# Patient Record
Sex: MALE | Race: WHITE | NOT HISPANIC OR LATINO | URBAN - METROPOLITAN AREA
[De-identification: names, ages, dates, MRNs, and addresses within clinical notes are randomized per-mention and may not be internally consistent; named-entity substitution may affect disease eponyms.]

---

## 2020-02-24 ENCOUNTER — APPOINTMENT (RX ONLY)
Dept: URBAN - METROPOLITAN AREA CLINIC 37 | Facility: CLINIC | Age: 75
Setting detail: DERMATOLOGY
End: 2020-02-24

## 2020-02-24 DIAGNOSIS — B02.9 ZOSTER WITHOUT COMPLICATIONS: ICD-10-CM

## 2020-02-24 PROCEDURE — 99202 OFFICE O/P NEW SF 15 MIN: CPT

## 2020-02-24 NOTE — HPI: SHINGLES (HERPES ZOSTER)
How Severe Are Your Shingles?: moderate
Is This A New Presentation, Or A Follow-Up?: Shingles
Additional History: Pt states he is on 400mg of Acyclovir.

## 2021-05-05 PROBLEM — H35.9 UNSPECIFIED RETINAL DISORDER: Noted: 2021-05-05

## 2021-05-05 PROBLEM — Z98.41 CATARACT EXTRACTION STATUS: Noted: 2020-12-01

## 2021-05-05 PROBLEM — H43.813 POSTERIOR VITREOUS DETACHMENT: Noted: 2022-01-19

## 2021-05-05 PROBLEM — Z98.42 CATARACT EXTRACTION STATUS: Noted: 2018-12-26

## 2021-05-05 PROBLEM — H35.3122 DRY AMD, INTERMEDIATE DRY STAGE: Noted: 2022-01-19

## 2021-05-05 PROBLEM — H35.3211 NEOVASCULAR AMD WITH ACTIVE CNV: Noted: 2022-01-19

## 2021-05-05 PROBLEM — H25.13 NS CATARACT: Noted: 2022-01-19

## 2021-05-05 PROBLEM — H40.1134 POAG, INDETERMINATE: Noted: 2022-01-19

## 2022-01-19 ENCOUNTER — PREPPED CHART (OUTPATIENT)
Dept: URBAN - METROPOLITAN AREA CLINIC 101 | Facility: CLINIC | Age: 77
End: 2022-01-19

## 2022-01-19 PROBLEM — H35.3122 DRY AMD, INTERMEDIATE DRY STAGE: Noted: 2022-01-19

## 2022-01-19 PROBLEM — H40.1134 POAG, INDETERMINATE: Noted: 2022-01-19

## 2022-01-19 PROBLEM — E11.3293 MILD NONPROLIFERATIVE DIABETIC RETINOPATHY: Noted: 2022-01-19

## 2022-01-19 PROBLEM — H35.3211 NEOVASCULAR AMD WITH ACTIVE CNV: Noted: 2022-01-19

## 2022-01-19 PROBLEM — H43.813 POSTERIOR VITREOUS DETACHMENT: Noted: 2022-01-19

## 2022-02-15 ASSESSMENT — VISUAL ACUITY
OS_CC: 20/25-1
OD_CC: 20/60-2
OD_PH: 20/50-2

## 2022-02-15 ASSESSMENT — TONOMETRY
OS_IOP_MMHG: 13
OD_IOP_MMHG: 12

## 2022-02-23 ENCOUNTER — FOLLOW UP (OUTPATIENT)
Dept: URBAN - METROPOLITAN AREA CLINIC 101 | Facility: CLINIC | Age: 77
End: 2022-02-23

## 2022-02-23 DIAGNOSIS — H35.3211: ICD-10-CM

## 2022-02-23 DIAGNOSIS — H43.813: ICD-10-CM

## 2022-02-23 DIAGNOSIS — H40.1134: ICD-10-CM

## 2022-02-23 DIAGNOSIS — H35.3122: ICD-10-CM

## 2022-02-23 DIAGNOSIS — E11.3293: ICD-10-CM

## 2022-02-23 PROCEDURE — 92014 COMPRE OPH EXAM EST PT 1/>: CPT | Mod: 25

## 2022-02-23 PROCEDURE — PFS EYLEA PFS

## 2022-02-23 PROCEDURE — 92134 CPTRZ OPH DX IMG PST SGM RTA: CPT

## 2022-02-23 PROCEDURE — 67028 INJECTION EYE DRUG: CPT

## 2022-02-23 ASSESSMENT — VISUAL ACUITY
OD_CC: 20/50-2
OS_CC: 20/30-2
OS_PH: 20/25-1

## 2022-02-23 ASSESSMENT — TONOMETRY
OD_IOP_MMHG: 16
OS_IOP_MMHG: 17

## 2022-03-30 ENCOUNTER — FOLLOW UP (OUTPATIENT)
Dept: URBAN - METROPOLITAN AREA CLINIC 101 | Facility: CLINIC | Age: 77
End: 2022-03-30

## 2022-03-30 DIAGNOSIS — H35.3122: ICD-10-CM

## 2022-03-30 DIAGNOSIS — H40.1134: ICD-10-CM

## 2022-03-30 DIAGNOSIS — E11.3293: ICD-10-CM

## 2022-03-30 DIAGNOSIS — H43.813: ICD-10-CM

## 2022-03-30 DIAGNOSIS — H35.3211: ICD-10-CM

## 2022-03-30 PROCEDURE — 67028 INJECTION EYE DRUG: CPT

## 2022-03-30 PROCEDURE — 92014 COMPRE OPH EXAM EST PT 1/>: CPT | Mod: 25

## 2022-03-30 PROCEDURE — PFS EYLEA PFS

## 2022-03-30 PROCEDURE — 92134 CPTRZ OPH DX IMG PST SGM RTA: CPT

## 2022-03-30 ASSESSMENT — VISUAL ACUITY
OS_CC: 20/25
OD_CC: 20/50+1

## 2022-03-30 ASSESSMENT — TONOMETRY
OS_IOP_MMHG: 18
OD_IOP_MMHG: 18

## 2022-05-12 ENCOUNTER — FOLLOW UP (OUTPATIENT)
Dept: URBAN - METROPOLITAN AREA CLINIC 101 | Facility: CLINIC | Age: 77
End: 2022-05-12

## 2022-05-12 DIAGNOSIS — H35.3211: ICD-10-CM

## 2022-05-12 DIAGNOSIS — E11.3293: ICD-10-CM

## 2022-05-12 DIAGNOSIS — H43.813: ICD-10-CM

## 2022-05-12 DIAGNOSIS — H35.3122: ICD-10-CM

## 2022-05-12 PROCEDURE — 67028 INJECTION EYE DRUG: CPT

## 2022-05-12 PROCEDURE — 92202 OPSCPY EXTND ON/MAC DRAW: CPT | Mod: 59

## 2022-05-12 PROCEDURE — PFS EYLEA PFS

## 2022-05-12 PROCEDURE — 92134 CPTRZ OPH DX IMG PST SGM RTA: CPT

## 2022-05-12 PROCEDURE — 92014 COMPRE OPH EXAM EST PT 1/>: CPT | Mod: 25

## 2022-05-12 ASSESSMENT — TONOMETRY
OS_IOP_MMHG: 15
OD_IOP_MMHG: 18

## 2022-05-12 ASSESSMENT — VISUAL ACUITY
OS_PH: 20/25-2
OD_PH: 20/50-2
OD_CC: 20/60-1
OS_CC: 20/30-1

## 2022-06-23 ENCOUNTER — FOLLOW UP (OUTPATIENT)
Dept: URBAN - METROPOLITAN AREA CLINIC 101 | Facility: CLINIC | Age: 77
End: 2022-06-23

## 2022-06-23 DIAGNOSIS — H35.3122: ICD-10-CM

## 2022-06-23 DIAGNOSIS — H35.3211: ICD-10-CM

## 2022-06-23 DIAGNOSIS — E11.3293: ICD-10-CM

## 2022-06-23 PROCEDURE — 92014 COMPRE OPH EXAM EST PT 1/>: CPT | Mod: 25

## 2022-06-23 PROCEDURE — PFS EYLEA PFS

## 2022-06-23 PROCEDURE — 67028 INJECTION EYE DRUG: CPT

## 2022-06-23 PROCEDURE — 92202 OPSCPY EXTND ON/MAC DRAW: CPT | Mod: 59

## 2022-06-23 PROCEDURE — 92134 CPTRZ OPH DX IMG PST SGM RTA: CPT

## 2022-06-23 ASSESSMENT — VISUAL ACUITY
OD_CC: 20/50-2
OS_CC: 20/30

## 2022-06-23 ASSESSMENT — TONOMETRY
OD_IOP_MMHG: 20
OS_IOP_MMHG: 21

## 2022-08-04 ENCOUNTER — FOLLOW UP (OUTPATIENT)
Dept: URBAN - METROPOLITAN AREA CLINIC 101 | Facility: CLINIC | Age: 77
End: 2022-08-04

## 2022-08-04 DIAGNOSIS — H35.3211: ICD-10-CM

## 2022-08-04 DIAGNOSIS — H35.3122: ICD-10-CM

## 2022-08-04 DIAGNOSIS — E11.3293: ICD-10-CM

## 2022-08-04 PROCEDURE — 92202 OPSCPY EXTND ON/MAC DRAW: CPT | Mod: 59

## 2022-08-04 PROCEDURE — 67028 INJECTION EYE DRUG: CPT

## 2022-08-04 PROCEDURE — PFS EYLEA PFS

## 2022-08-04 PROCEDURE — 92134 CPTRZ OPH DX IMG PST SGM RTA: CPT

## 2022-08-04 PROCEDURE — 92014 COMPRE OPH EXAM EST PT 1/>: CPT | Mod: 25

## 2022-08-04 ASSESSMENT — VISUAL ACUITY
OS_PH: 20/25-1
OD_SC: 20/60
OS_SC: 20/30-1

## 2022-08-04 ASSESSMENT — TONOMETRY
OD_IOP_MMHG: 20
OS_IOP_MMHG: 25

## 2022-09-15 ENCOUNTER — FOLLOW UP (OUTPATIENT)
Dept: URBAN - METROPOLITAN AREA CLINIC 101 | Facility: CLINIC | Age: 77
End: 2022-09-15

## 2022-09-15 DIAGNOSIS — H35.3122: ICD-10-CM

## 2022-09-15 DIAGNOSIS — H35.3211: ICD-10-CM

## 2022-09-15 DIAGNOSIS — E11.3293: ICD-10-CM

## 2022-09-15 PROCEDURE — 67028 INJECTION EYE DRUG: CPT

## 2022-09-15 PROCEDURE — 92134 CPTRZ OPH DX IMG PST SGM RTA: CPT

## 2022-09-15 PROCEDURE — PFS EYLEA PFS

## 2022-09-15 PROCEDURE — 92014 COMPRE OPH EXAM EST PT 1/>: CPT | Mod: 25

## 2022-09-15 PROCEDURE — 92202 OPSCPY EXTND ON/MAC DRAW: CPT | Mod: 59

## 2022-09-15 ASSESSMENT — VISUAL ACUITY
OS_PH: 20/25-1
OS_SC: 20/30-1
OD_PH: 20/50-2
OD_SC: 20/60-2

## 2022-09-15 ASSESSMENT — TONOMETRY
OS_IOP_MMHG: 17
OD_IOP_MMHG: 15

## 2022-10-27 ENCOUNTER — FOLLOW UP (OUTPATIENT)
Dept: URBAN - METROPOLITAN AREA CLINIC 101 | Facility: CLINIC | Age: 77
End: 2022-10-27

## 2022-10-27 DIAGNOSIS — H35.3211: ICD-10-CM

## 2022-10-27 DIAGNOSIS — H35.3122: ICD-10-CM

## 2022-10-27 DIAGNOSIS — E11.3293: ICD-10-CM

## 2022-10-27 PROCEDURE — 92014 COMPRE OPH EXAM EST PT 1/>: CPT | Mod: 25

## 2022-10-27 PROCEDURE — 92202 OPSCPY EXTND ON/MAC DRAW: CPT | Mod: 59

## 2022-10-27 PROCEDURE — 67028 INJECTION EYE DRUG: CPT

## 2022-10-27 PROCEDURE — PFS EYLEA PFS

## 2022-10-27 PROCEDURE — 92134 CPTRZ OPH DX IMG PST SGM RTA: CPT

## 2022-10-27 ASSESSMENT — TONOMETRY
OS_IOP_MMHG: 24
OD_IOP_MMHG: 16

## 2022-10-27 ASSESSMENT — VISUAL ACUITY
OD_PH: 20/50
OS_PH: 20/25
OS_SC: 20/30
OD_SC: 20/60-2

## 2022-12-08 ENCOUNTER — FOLLOW UP (OUTPATIENT)
Dept: URBAN - METROPOLITAN AREA CLINIC 101 | Facility: CLINIC | Age: 77
End: 2022-12-08

## 2022-12-08 DIAGNOSIS — H43.813: ICD-10-CM

## 2022-12-08 DIAGNOSIS — H35.3211: ICD-10-CM

## 2022-12-08 DIAGNOSIS — E11.3293: ICD-10-CM

## 2022-12-08 DIAGNOSIS — H35.3122: ICD-10-CM

## 2022-12-08 PROCEDURE — 67028 INJECTION EYE DRUG: CPT

## 2022-12-08 PROCEDURE — 92134 CPTRZ OPH DX IMG PST SGM RTA: CPT

## 2022-12-08 PROCEDURE — 92014 COMPRE OPH EXAM EST PT 1/>: CPT | Mod: 25

## 2022-12-08 PROCEDURE — PFS EYLEA PFS

## 2022-12-08 PROCEDURE — 92202 OPSCPY EXTND ON/MAC DRAW: CPT | Mod: 59

## 2022-12-08 ASSESSMENT — TONOMETRY
OD_IOP_MMHG: 17
OS_IOP_MMHG: 20

## 2022-12-08 ASSESSMENT — VISUAL ACUITY
OD_CC: 20/50
OS_CC: 20/25

## 2023-02-02 ENCOUNTER — FOLLOW UP (OUTPATIENT)
Dept: URBAN - METROPOLITAN AREA CLINIC 101 | Facility: CLINIC | Age: 78
End: 2023-02-02

## 2023-02-02 DIAGNOSIS — H35.3211: ICD-10-CM

## 2023-02-02 DIAGNOSIS — H43.813: ICD-10-CM

## 2023-02-02 DIAGNOSIS — E11.3293: ICD-10-CM

## 2023-02-02 DIAGNOSIS — H35.3122: ICD-10-CM

## 2023-02-02 PROCEDURE — 67028 INJECTION EYE DRUG: CPT

## 2023-02-02 PROCEDURE — 92014 COMPRE OPH EXAM EST PT 1/>: CPT | Mod: 25

## 2023-02-02 PROCEDURE — 92202 OPSCPY EXTND ON/MAC DRAW: CPT | Mod: 59

## 2023-02-02 PROCEDURE — 92134 CPTRZ OPH DX IMG PST SGM RTA: CPT

## 2023-02-02 PROCEDURE — PFS EYLEA PFS

## 2023-02-02 ASSESSMENT — VISUAL ACUITY
OD_CC: 20/60-1
OS_CC: 20/25-1

## 2023-02-02 ASSESSMENT — TONOMETRY
OD_IOP_MMHG: 13
OS_IOP_MMHG: 17

## 2023-03-23 ENCOUNTER — FOLLOW UP (OUTPATIENT)
Dept: URBAN - METROPOLITAN AREA CLINIC 101 | Facility: CLINIC | Age: 78
End: 2023-03-23

## 2023-03-23 DIAGNOSIS — E11.3293: ICD-10-CM

## 2023-03-23 DIAGNOSIS — H35.3211: ICD-10-CM

## 2023-03-23 DIAGNOSIS — H43.813: ICD-10-CM

## 2023-03-23 DIAGNOSIS — H40.1134: ICD-10-CM

## 2023-03-23 DIAGNOSIS — H35.3122: ICD-10-CM

## 2023-03-23 PROCEDURE — 92134 CPTRZ OPH DX IMG PST SGM RTA: CPT

## 2023-03-23 PROCEDURE — 92014 COMPRE OPH EXAM EST PT 1/>: CPT | Mod: 25

## 2023-03-23 PROCEDURE — 92202 OPSCPY EXTND ON/MAC DRAW: CPT | Mod: 59

## 2023-03-23 PROCEDURE — PFS EYLEA PFS

## 2023-03-23 PROCEDURE — 67028 INJECTION EYE DRUG: CPT

## 2023-03-23 ASSESSMENT — TONOMETRY
OS_IOP_MMHG: 21
OD_IOP_MMHG: 19

## 2023-03-23 ASSESSMENT — VISUAL ACUITY
OS_CC: 20/30
OD_PH: 20/50-1
OD_CC: 20/70+2

## 2023-05-04 ENCOUNTER — APPOINTMENT (RX ONLY)
Dept: URBAN - METROPOLITAN AREA CLINIC 37 | Facility: CLINIC | Age: 78
Setting detail: DERMATOLOGY
End: 2023-05-04

## 2023-05-04 DIAGNOSIS — L57.0 ACTINIC KERATOSIS: ICD-10-CM

## 2023-05-04 DIAGNOSIS — L82.1 OTHER SEBORRHEIC KERATOSIS: ICD-10-CM

## 2023-05-04 DIAGNOSIS — D22 MELANOCYTIC NEVI: ICD-10-CM

## 2023-05-04 PROBLEM — D22.9 MELANOCYTIC NEVI, UNSPECIFIED: Status: ACTIVE | Noted: 2023-05-04

## 2023-05-04 PROCEDURE — 17003 DESTRUCT PREMALG LES 2-14: CPT

## 2023-05-04 PROCEDURE — ? TREATMENT REGIMEN

## 2023-05-04 PROCEDURE — ? COUNSELING

## 2023-05-04 PROCEDURE — ? ADDITIONAL NOTES

## 2023-05-04 PROCEDURE — 17000 DESTRUCT PREMALG LESION: CPT

## 2023-05-04 PROCEDURE — ? LIQUID NITROGEN

## 2023-05-04 PROCEDURE — 99203 OFFICE O/P NEW LOW 30 MIN: CPT | Mod: 25

## 2023-05-04 ASSESSMENT — LOCATION DETAILED DESCRIPTION DERM
LOCATION DETAILED: RIGHT FOREHEAD
LOCATION DETAILED: LEFT VENTRAL PROXIMAL FOREARM
LOCATION DETAILED: LEFT LATERAL UPPER BACK
LOCATION DETAILED: LEFT INFERIOR LATERAL FOREHEAD
LOCATION DETAILED: INFERIOR MID FOREHEAD

## 2023-05-04 ASSESSMENT — LOCATION SIMPLE DESCRIPTION DERM
LOCATION SIMPLE: INFERIOR FOREHEAD
LOCATION SIMPLE: LEFT FOREARM
LOCATION SIMPLE: LEFT FOREHEAD
LOCATION SIMPLE: RIGHT FOREHEAD
LOCATION SIMPLE: LEFT UPPER BACK

## 2023-05-04 ASSESSMENT — LOCATION ZONE DERM
LOCATION ZONE: ARM
LOCATION ZONE: FACE
LOCATION ZONE: TRUNK

## 2023-05-04 NOTE — HPI: BUMPS
How Severe Are Your Bumps?: moderate
Have Your Bumps Been Treated?: not been treated
Is This A New Presentation, Or A Follow-Up?: Bumps
Additional History: Patient thinks bumps on back are cysts

## 2023-05-04 NOTE — PROCEDURE: LIQUID NITROGEN
Render Note In Bullet Format When Appropriate: No
Show Aperture Variable?: Yes
Duration Of Freeze Thaw-Cycle (Seconds): 4
Consent: The patient's consent was obtained including but not limited to risks of crusting, scabbing, blistering, scarring, darker or lighter pigmentary change, recurrence, incomplete removal and infection.
Post-Care Instructions: I reviewed with the patient in detail post-care instructions. Patient is to wear sunprotection, and avoid picking at any of the treated lesions. Pt may apply Vaseline to crusted or scabbing areas.
Detail Level: Detailed

## 2023-05-11 ENCOUNTER — FOLLOW UP (OUTPATIENT)
Dept: URBAN - METROPOLITAN AREA CLINIC 101 | Facility: CLINIC | Age: 78
End: 2023-05-11

## 2023-05-11 DIAGNOSIS — H35.3122: ICD-10-CM

## 2023-05-11 DIAGNOSIS — H40.1134: ICD-10-CM

## 2023-05-11 DIAGNOSIS — H43.813: ICD-10-CM

## 2023-05-11 DIAGNOSIS — E11.3293: ICD-10-CM

## 2023-05-11 DIAGNOSIS — H35.3211: ICD-10-CM

## 2023-05-11 PROCEDURE — 92134 CPTRZ OPH DX IMG PST SGM RTA: CPT

## 2023-05-11 PROCEDURE — 92202 OPSCPY EXTND ON/MAC DRAW: CPT | Mod: 59

## 2023-05-11 PROCEDURE — 92014 COMPRE OPH EXAM EST PT 1/>: CPT | Mod: 25

## 2023-05-11 PROCEDURE — PFS EYLEA PFS

## 2023-05-11 PROCEDURE — 67028 INJECTION EYE DRUG: CPT

## 2023-05-11 ASSESSMENT — TONOMETRY
OS_IOP_MMHG: 20
OD_IOP_MMHG: 18

## 2023-05-11 ASSESSMENT — VISUAL ACUITY
OS_CC: 20/20-1
OD_PH: 20/60-2
OD_CC: 20/100-1

## 2023-07-06 ENCOUNTER — FOLLOW UP (OUTPATIENT)
Dept: URBAN - METROPOLITAN AREA CLINIC 101 | Facility: CLINIC | Age: 78
End: 2023-07-06

## 2023-07-06 DIAGNOSIS — H43.813: ICD-10-CM

## 2023-07-06 DIAGNOSIS — E11.3293: ICD-10-CM

## 2023-07-06 DIAGNOSIS — H35.3122: ICD-10-CM

## 2023-07-06 DIAGNOSIS — H40.1134: ICD-10-CM

## 2023-07-06 DIAGNOSIS — H35.3211: ICD-10-CM

## 2023-07-06 PROCEDURE — 92014 COMPRE OPH EXAM EST PT 1/>: CPT | Mod: 25

## 2023-07-06 PROCEDURE — 92134 CPTRZ OPH DX IMG PST SGM RTA: CPT

## 2023-07-06 PROCEDURE — PFS EYLEA PFS: Mod: JZ

## 2023-07-06 PROCEDURE — 92202 OPSCPY EXTND ON/MAC DRAW: CPT | Mod: 59

## 2023-07-06 PROCEDURE — 67028 INJECTION EYE DRUG: CPT

## 2023-07-06 ASSESSMENT — TONOMETRY
OD_IOP_MMHG: 21
OS_IOP_MMHG: 19
OD_IOP_MMHG: 22

## 2023-07-06 ASSESSMENT — VISUAL ACUITY
OS_CC: 20/50-2
OD_CC: 20/80-1

## 2023-09-21 ENCOUNTER — FOLLOW UP (OUTPATIENT)
Dept: URBAN - METROPOLITAN AREA CLINIC 101 | Facility: CLINIC | Age: 78
End: 2023-09-21

## 2023-09-21 DIAGNOSIS — H40.1134: ICD-10-CM

## 2023-09-21 DIAGNOSIS — H43.813: ICD-10-CM

## 2023-09-21 DIAGNOSIS — E11.3293: ICD-10-CM

## 2023-09-21 DIAGNOSIS — H35.3211: ICD-10-CM

## 2023-09-21 DIAGNOSIS — H35.3122: ICD-10-CM

## 2023-09-21 PROCEDURE — 67028 INJECTION EYE DRUG: CPT

## 2023-09-21 PROCEDURE — 92202 OPSCPY EXTND ON/MAC DRAW: CPT | Mod: 59

## 2023-09-21 PROCEDURE — 92014 COMPRE OPH EXAM EST PT 1/>: CPT | Mod: 25

## 2023-09-21 PROCEDURE — 92134 CPTRZ OPH DX IMG PST SGM RTA: CPT

## 2023-09-21 PROCEDURE — PFS EYLEA PFS: Mod: JZ

## 2023-09-21 ASSESSMENT — TONOMETRY
OS_IOP_MMHG: 20
OS_IOP_MMHG: 25
OD_IOP_MMHG: 25

## 2023-09-21 ASSESSMENT — VISUAL ACUITY
OS_CC: 20/20-2
OD_CC: 20/100-1
OD_PH: 20/70-1

## 2023-11-16 ENCOUNTER — FOLLOW UP (OUTPATIENT)
Dept: URBAN - METROPOLITAN AREA CLINIC 101 | Facility: CLINIC | Age: 78
End: 2023-11-16

## 2023-11-16 DIAGNOSIS — H35.3122: ICD-10-CM

## 2023-11-16 DIAGNOSIS — H43.813: ICD-10-CM

## 2023-11-16 DIAGNOSIS — E11.3293: ICD-10-CM

## 2023-11-16 DIAGNOSIS — H40.1134: ICD-10-CM

## 2023-11-16 DIAGNOSIS — H35.3211: ICD-10-CM

## 2023-11-16 PROCEDURE — PFS EYLEA PFS: Mod: JZ

## 2023-11-16 PROCEDURE — 92202 OPSCPY EXTND ON/MAC DRAW: CPT | Mod: 59

## 2023-11-16 PROCEDURE — 92134 CPTRZ OPH DX IMG PST SGM RTA: CPT

## 2023-11-16 PROCEDURE — 92014 COMPRE OPH EXAM EST PT 1/>: CPT | Mod: 25

## 2023-11-16 PROCEDURE — 67028 INJECTION EYE DRUG: CPT

## 2023-11-16 ASSESSMENT — TONOMETRY
OS_IOP_MMHG: 25
OD_IOP_MMHG: 30

## 2023-11-16 ASSESSMENT — VISUAL ACUITY
OD_SC: 20/50
OS_SC: 20/40+2

## 2024-01-11 ENCOUNTER — FOLLOW UP (OUTPATIENT)
Dept: URBAN - METROPOLITAN AREA CLINIC 101 | Facility: CLINIC | Age: 79
End: 2024-01-11

## 2024-01-11 DIAGNOSIS — H35.3122: ICD-10-CM

## 2024-01-11 DIAGNOSIS — H35.3211: ICD-10-CM

## 2024-01-11 DIAGNOSIS — E11.3293: ICD-10-CM

## 2024-01-11 DIAGNOSIS — H40.1134: ICD-10-CM

## 2024-01-11 DIAGNOSIS — H43.813: ICD-10-CM

## 2024-01-11 PROCEDURE — 92134 CPTRZ OPH DX IMG PST SGM RTA: CPT

## 2024-01-11 PROCEDURE — PFS EYLEA PFS: Mod: JZ

## 2024-01-11 PROCEDURE — 67028 INJECTION EYE DRUG: CPT

## 2024-01-11 PROCEDURE — 92014 COMPRE OPH EXAM EST PT 1/>: CPT | Mod: 25

## 2024-01-11 PROCEDURE — 92202 OPSCPY EXTND ON/MAC DRAW: CPT | Mod: 59

## 2024-01-11 ASSESSMENT — TONOMETRY
OD_IOP_MMHG: 18
OS_IOP_MMHG: 17

## 2024-01-11 ASSESSMENT — VISUAL ACUITY
OS_CC: 20/40-1
OD_CC: 20/80-1

## 2024-03-14 ENCOUNTER — FOLLOW UP (OUTPATIENT)
Dept: URBAN - METROPOLITAN AREA CLINIC 101 | Facility: CLINIC | Age: 79
End: 2024-03-14

## 2024-03-14 DIAGNOSIS — E11.3293: ICD-10-CM

## 2024-03-14 DIAGNOSIS — H35.3211: ICD-10-CM

## 2024-03-14 DIAGNOSIS — H35.3122: ICD-10-CM

## 2024-03-14 DIAGNOSIS — H40.1134: ICD-10-CM

## 2024-03-14 DIAGNOSIS — H43.813: ICD-10-CM

## 2024-03-14 PROCEDURE — 92134 CPTRZ OPH DX IMG PST SGM RTA: CPT

## 2024-03-14 PROCEDURE — 67028 INJECTION EYE DRUG: CPT

## 2024-03-14 PROCEDURE — 92014 COMPRE OPH EXAM EST PT 1/>: CPT | Mod: 25

## 2024-03-14 PROCEDURE — PFS EYLEA PFS: Mod: JZ

## 2024-03-14 PROCEDURE — 92202 OPSCPY EXTND ON/MAC DRAW: CPT | Mod: 59

## 2024-03-14 ASSESSMENT — TONOMETRY
OS_IOP_MMHG: 24
OD_IOP_MMHG: 26
OD_IOP_MMHG: 27

## 2024-03-14 ASSESSMENT — VISUAL ACUITY
OD_CC: 20/60-2
OS_PH: 20/25-1
OS_CC: 20/30

## 2024-05-16 ENCOUNTER — FOLLOW UP (OUTPATIENT)
Dept: URBAN - METROPOLITAN AREA CLINIC 101 | Facility: CLINIC | Age: 79
End: 2024-05-16

## 2024-05-16 DIAGNOSIS — H40.1134: ICD-10-CM

## 2024-05-16 DIAGNOSIS — E11.3293: ICD-10-CM

## 2024-05-16 DIAGNOSIS — H35.3211: ICD-10-CM

## 2024-05-16 DIAGNOSIS — H35.3122: ICD-10-CM

## 2024-05-16 DIAGNOSIS — H43.813: ICD-10-CM

## 2024-05-16 PROCEDURE — 67028 INJECTION EYE DRUG: CPT

## 2024-05-16 PROCEDURE — 92014 COMPRE OPH EXAM EST PT 1/>: CPT | Mod: 25

## 2024-05-16 PROCEDURE — PFS EYLEA PFS: Mod: JZ

## 2024-05-16 PROCEDURE — 92202 OPSCPY EXTND ON/MAC DRAW: CPT | Mod: 59

## 2024-05-16 PROCEDURE — 92134 CPTRZ OPH DX IMG PST SGM RTA: CPT

## 2024-05-16 ASSESSMENT — VISUAL ACUITY
OD_CC: 20/60+2
OS_PH: 20/30-2
OS_CC: 20/40

## 2024-05-16 ASSESSMENT — TONOMETRY
OD_IOP_MMHG: 24
OS_IOP_MMHG: 31

## 2024-08-08 ENCOUNTER — FOLLOW UP (OUTPATIENT)
Dept: URBAN - METROPOLITAN AREA CLINIC 101 | Facility: CLINIC | Age: 79
End: 2024-08-08

## 2024-08-08 DIAGNOSIS — H43.813: ICD-10-CM

## 2024-08-08 DIAGNOSIS — H35.3211: ICD-10-CM

## 2024-08-08 DIAGNOSIS — H40.1134: ICD-10-CM

## 2024-08-08 DIAGNOSIS — H35.3122: ICD-10-CM

## 2024-08-08 DIAGNOSIS — E11.3293: ICD-10-CM

## 2024-08-08 PROCEDURE — 92134 CPTRZ OPH DX IMG PST SGM RTA: CPT

## 2024-08-08 PROCEDURE — 67028 INJECTION EYE DRUG: CPT

## 2024-08-08 PROCEDURE — PFS EYLEA PFS: Mod: JZ

## 2024-08-08 PROCEDURE — 92202 OPSCPY EXTND ON/MAC DRAW: CPT | Mod: 59

## 2024-08-08 PROCEDURE — 92014 COMPRE OPH EXAM EST PT 1/>: CPT | Mod: 25

## 2024-08-08 ASSESSMENT — TONOMETRY
OS_IOP_MMHG: 14
OD_IOP_MMHG: 16

## 2024-08-08 ASSESSMENT — VISUAL ACUITY
OS_CC: 20/40
OD_CC: 20/80-1
OS_PH: 20/30-1

## 2024-10-24 ENCOUNTER — FOLLOW UP (OUTPATIENT)
Dept: URBAN - METROPOLITAN AREA CLINIC 101 | Facility: CLINIC | Age: 79
End: 2024-10-24

## 2024-10-24 DIAGNOSIS — E11.3293: ICD-10-CM

## 2024-10-24 DIAGNOSIS — H43.813: ICD-10-CM

## 2024-10-24 DIAGNOSIS — H40.1134: ICD-10-CM

## 2024-10-24 DIAGNOSIS — H35.3122: ICD-10-CM

## 2024-10-24 DIAGNOSIS — H35.3211: ICD-10-CM

## 2024-10-24 PROCEDURE — 92202 OPSCPY EXTND ON/MAC DRAW: CPT | Mod: 59

## 2024-10-24 PROCEDURE — 67028 INJECTION EYE DRUG: CPT

## 2024-10-24 PROCEDURE — PFS EYLEA PFS: Mod: JZ

## 2024-10-24 PROCEDURE — 92134 CPTRZ OPH DX IMG PST SGM RTA: CPT

## 2024-10-24 PROCEDURE — 92014 COMPRE OPH EXAM EST PT 1/>: CPT | Mod: 25

## 2024-10-24 ASSESSMENT — TONOMETRY
OD_IOP_MMHG: 18
OS_IOP_MMHG: 19

## 2024-10-24 ASSESSMENT — VISUAL ACUITY
OD_CC: 20/100+1
OS_CC: 20/30

## 2025-02-13 ENCOUNTER — FOLLOW UP (OUTPATIENT)
Dept: URBAN - METROPOLITAN AREA CLINIC 101 | Facility: CLINIC | Age: 80
End: 2025-02-13

## 2025-02-13 DIAGNOSIS — H35.3211: ICD-10-CM

## 2025-02-13 DIAGNOSIS — H40.1134: ICD-10-CM

## 2025-02-13 DIAGNOSIS — H35.3122: ICD-10-CM

## 2025-02-13 DIAGNOSIS — E11.3293: ICD-10-CM

## 2025-02-13 DIAGNOSIS — H43.813: ICD-10-CM

## 2025-02-13 PROCEDURE — 92202 OPSCPY EXTND ON/MAC DRAW: CPT | Mod: 59

## 2025-02-13 PROCEDURE — 67028 INJECTION EYE DRUG: CPT

## 2025-02-13 PROCEDURE — 92134 CPTRZ OPH DX IMG PST SGM RTA: CPT

## 2025-02-13 PROCEDURE — PFS EYLEA PFS: Mod: JZ,NC

## 2025-02-13 PROCEDURE — 92014 COMPRE OPH EXAM EST PT 1/>: CPT | Mod: 25

## 2025-02-13 ASSESSMENT — VISUAL ACUITY
OD_PH: 20/80-3
OD_CC: 20/100
OS_CC: 20/25-1
OS_PH: 20/20-1

## 2025-02-13 ASSESSMENT — TONOMETRY
OS_IOP_MMHG: 21
OD_IOP_MMHG: 19

## 2025-07-17 ENCOUNTER — FOLLOW UP (OUTPATIENT)
Dept: URBAN - METROPOLITAN AREA CLINIC 101 | Facility: CLINIC | Age: 80
End: 2025-07-17

## 2025-07-17 DIAGNOSIS — H35.3122: ICD-10-CM

## 2025-07-17 DIAGNOSIS — H40.1134: ICD-10-CM

## 2025-07-17 DIAGNOSIS — E11.3293: ICD-10-CM

## 2025-07-17 DIAGNOSIS — H43.813: ICD-10-CM

## 2025-07-17 DIAGNOSIS — H35.3211: ICD-10-CM

## 2025-07-17 PROCEDURE — PFS EYLEA PFS: Mod: JZ

## 2025-07-17 PROCEDURE — 92134 CPTRZ OPH DX IMG PST SGM RTA: CPT

## 2025-07-17 PROCEDURE — 92202 OPSCPY EXTND ON/MAC DRAW: CPT | Mod: 59

## 2025-07-17 PROCEDURE — 92014 COMPRE OPH EXAM EST PT 1/>: CPT | Mod: 25

## 2025-07-17 PROCEDURE — 67028 INJECTION EYE DRUG: CPT

## 2025-07-17 ASSESSMENT — VISUAL ACUITY
OD_CC: 20/150
OS_CC: 20/50-2

## 2025-07-17 ASSESSMENT — TONOMETRY
OD_IOP_MMHG: 17
OS_IOP_MMHG: 18